# Patient Record
Sex: MALE | Race: WHITE | Employment: FULL TIME | URBAN - METROPOLITAN AREA
[De-identification: names, ages, dates, MRNs, and addresses within clinical notes are randomized per-mention and may not be internally consistent; named-entity substitution may affect disease eponyms.]

---

## 2020-09-09 PROBLEM — I35.0 SEVERE AORTIC STENOSIS: Status: ACTIVE | Noted: 2020-09-09

## 2020-09-09 PROBLEM — I10 ESSENTIAL HYPERTENSION: Status: ACTIVE | Noted: 2020-09-09

## 2020-09-09 PROBLEM — G47.33 OSA AND COPD OVERLAP SYNDROME (HCC): Status: ACTIVE | Noted: 2020-09-09

## 2020-09-09 PROBLEM — Z95.1 HX OF CABG: Status: ACTIVE | Noted: 2020-09-09

## 2020-09-09 PROBLEM — I25.10 CORONARY ARTERY DISEASE INVOLVING NATIVE CORONARY ARTERY OF NATIVE HEART WITHOUT ANGINA PECTORIS: Status: ACTIVE | Noted: 2020-09-09

## 2020-09-09 PROBLEM — E78.5 DYSLIPIDEMIA: Status: ACTIVE | Noted: 2020-09-09

## 2020-09-09 PROBLEM — J44.9 OSA AND COPD OVERLAP SYNDROME (HCC): Status: ACTIVE | Noted: 2020-09-09

## 2020-09-09 PROBLEM — J43.1 PANLOBULAR EMPHYSEMA (HCC): Status: ACTIVE | Noted: 2020-09-09

## 2020-09-10 PROBLEM — E78.2 MIXED HYPERLIPIDEMIA: Status: ACTIVE | Noted: 2020-09-10

## 2020-09-29 ENCOUNTER — HOSPITAL ENCOUNTER (OUTPATIENT)
Dept: CV DIAGNOSTICS | Facility: HOSPITAL | Age: 70
Discharge: HOME | End: 2020-09-29
Attending: NURSE PRACTITIONER
Payer: MEDICARE

## 2020-09-29 ENCOUNTER — HOSPITAL ENCOUNTER (OUTPATIENT)
Dept: CARDIOLOGY CLINIC | Facility: HOSPITAL | Age: 70
Discharge: HOME | End: 2020-09-29
Attending: NURSE PRACTITIONER
Payer: MEDICARE

## 2020-09-29 ENCOUNTER — HOSPITAL ENCOUNTER (OUTPATIENT)
Dept: CT IMAGING | Facility: HOSPITAL | Age: 70
Discharge: HOME | End: 2020-09-29
Attending: NURSE PRACTITIONER
Payer: MEDICARE

## 2020-09-29 ENCOUNTER — HOSPITAL ENCOUNTER (OUTPATIENT)
Dept: ULTRASOUND IMAGING | Facility: HOSPITAL | Age: 70
Discharge: HOME | End: 2020-09-29
Attending: NURSE PRACTITIONER
Payer: MEDICARE

## 2020-09-29 ENCOUNTER — HOSPITAL ENCOUNTER (OUTPATIENT)
Dept: LAB | Facility: HOSPITAL | Age: 70
Discharge: HOME | End: 2020-09-29
Attending: NURSE PRACTITIONER
Payer: MEDICARE

## 2020-09-29 VITALS
SYSTOLIC BLOOD PRESSURE: 123 MMHG | DIASTOLIC BLOOD PRESSURE: 69 MMHG | OXYGEN SATURATION: 97 % | HEART RATE: 58 BPM | RESPIRATION RATE: 16 BRPM

## 2020-09-29 VITALS — HEART RATE: 64 BPM | SYSTOLIC BLOOD PRESSURE: 106 MMHG | DIASTOLIC BLOOD PRESSURE: 62 MMHG

## 2020-09-29 DIAGNOSIS — R09.89 OTHER SPECIFIED SYMPTOMS AND SIGNS INVOLVING THE CIRCULATORY AND RESPIRATORY SYSTEMS: ICD-10-CM

## 2020-09-29 DIAGNOSIS — Z95.1 HX OF CABG: ICD-10-CM

## 2020-09-29 DIAGNOSIS — I35.0 SEVERE AORTIC STENOSIS: ICD-10-CM

## 2020-09-29 DIAGNOSIS — I25.10 CORONARY ARTERY DISEASE INVOLVING NATIVE CORONARY ARTERY OF NATIVE HEART WITHOUT ANGINA PECTORIS: ICD-10-CM

## 2020-09-29 DIAGNOSIS — J44.9 OSA AND COPD OVERLAP SYNDROME (HCC): ICD-10-CM

## 2020-09-29 DIAGNOSIS — J43.1 PANLOBULAR EMPHYSEMA (HCC): ICD-10-CM

## 2020-09-29 DIAGNOSIS — I10 ESSENTIAL HYPERTENSION: Primary | ICD-10-CM

## 2020-09-29 DIAGNOSIS — E78.5 DYSLIPIDEMIA: ICD-10-CM

## 2020-09-29 DIAGNOSIS — G47.33 OSA AND COPD OVERLAP SYNDROME (HCC): ICD-10-CM

## 2020-09-29 PROCEDURE — 74174 CTA ABD&PLVS W/CONTRAST: CPT | Performed by: NURSE PRACTITIONER

## 2020-09-29 PROCEDURE — 93880 EXTRACRANIAL BILAT STUDY: CPT | Performed by: NURSE PRACTITIONER

## 2020-09-29 PROCEDURE — 93306 TTE W/DOPPLER COMPLETE: CPT | Performed by: NURSE PRACTITIONER

## 2020-09-29 PROCEDURE — 80048 BASIC METABOLIC PNL TOTAL CA: CPT | Performed by: NURSE PRACTITIONER

## 2020-09-29 PROCEDURE — 71275 CT ANGIOGRAPHY CHEST: CPT | Performed by: NURSE PRACTITIONER

## 2020-09-29 PROCEDURE — 85025 COMPLETE CBC W/AUTO DIFF WBC: CPT | Performed by: NURSE PRACTITIONER

## 2020-09-29 PROCEDURE — 36415 COLL VENOUS BLD VENIPUNCTURE: CPT | Performed by: NURSE PRACTITIONER

## 2020-09-29 PROCEDURE — 93005 ELECTROCARDIOGRAM TRACING: CPT | Performed by: INTERNAL MEDICINE

## 2020-09-29 PROCEDURE — 93010 ELECTROCARDIOGRAM REPORT: CPT | Performed by: INTERNAL MEDICINE

## 2020-09-29 NOTE — CONSULTS
Cardiothoracic Surgery  Structural Heart Consult       Referring: Dr Santino Cuadra  PCP: Dr Nelida Johnson  Reason for consult: severe symptomatic aortic stenosis  69year old with symptomatic aortic stenosis with increased SOB, PALMA, and fatigue  Echo:    ASHLYN:  .97 cm2 STS Risk Score: 2 %      The chart, old records and the cardiac cath report was reviewed  Labs were reviewed and pre-op labs have been ordered  CT of chest was ordered   Pt was seen and examined by me today and after reviewing the chart and talking with

## 2020-09-29 NOTE — PROGRESS NOTES
TAVR procedure and process explained and all questions answered. Educational folder and video provided. Tentative procedure date of 10/13/20. COVID test 10/9/20 at [facility] location. Instructions given to pt.

## 2020-09-29 NOTE — PROGRESS NOTES
PIV removed from [address] [place] [address]. Manual pressure held and gauze applied to site.

## 2020-09-29 NOTE — IMAGING NOTE
Pt testing in radiology, CT and US, complete. PIV to L AC left in as pt with Echo to be done at 1245 per schedule. SBAR given to Shane from TAVR clinic that IV access remains in place. They will d/c PIV when testing complete for today.

## 2020-09-29 NOTE — IMAGING NOTE
Received [facility] [place] to CT Rm 4 GE working with [place]. Verified name, , allergies. IV access with 18G angiocath to left antecubital area. O2 applied via nasal cannula @ 2LPM.  Positioned pt on table.  Procedure explained and questions answer

## 2020-10-07 NOTE — PROGRESS NOTES
Test results to be discussed by primary cardiologist.    Incidental non-cardiac findings should still be addressed with patient's PCP if warranted.

## 2021-11-03 PROBLEM — Z95.2 HISTORY OF AORTIC VALVE REPLACEMENT: Status: ACTIVE | Noted: 2021-11-03

## (undated) NOTE — LETTER
September 29, 2020    [employer]  [id number] [id number]      Dear Zeb Quiroz: The following are the results of your recent tests ordered by [facility]. Please review the list of test results.   Your result is the number on the Results reviewed.  Please inform patient that his platelets are lower than they have been.  Noted they have been low in the past but should be followed up by your PCP or hematologist.    If you have any questions about these test results, you may contact on